# Patient Record
Sex: MALE | Race: WHITE | NOT HISPANIC OR LATINO | Employment: OTHER | URBAN - METROPOLITAN AREA
[De-identification: names, ages, dates, MRNs, and addresses within clinical notes are randomized per-mention and may not be internally consistent; named-entity substitution may affect disease eponyms.]

---

## 2018-05-27 ENCOUNTER — HOSPITAL ENCOUNTER (EMERGENCY)
Facility: HOSPITAL | Age: 23
Discharge: HOME | End: 2018-05-27
Attending: FAMILY MEDICINE
Payer: COMMERCIAL

## 2018-05-27 VITALS
WEIGHT: 170 LBS | TEMPERATURE: 98 F | HEIGHT: 67 IN | RESPIRATION RATE: 20 BRPM | BODY MASS INDEX: 26.68 KG/M2 | DIASTOLIC BLOOD PRESSURE: 62 MMHG | HEART RATE: 73 BPM | OXYGEN SATURATION: 100 % | SYSTOLIC BLOOD PRESSURE: 111 MMHG

## 2018-05-27 DIAGNOSIS — R60.9 SWELLING: ICD-10-CM

## 2018-05-27 DIAGNOSIS — R52 PAIN: ICD-10-CM

## 2018-05-27 PROCEDURE — 73610 X-RAY EXAM OF ANKLE: CPT | Mod: 26,RT,, | Performed by: RADIOLOGY

## 2018-05-27 PROCEDURE — 73610 X-RAY EXAM OF ANKLE: CPT | Mod: TC,FY,RT

## 2018-05-27 PROCEDURE — 99283 EMERGENCY DEPT VISIT LOW MDM: CPT | Mod: 25

## 2018-05-28 NOTE — ED PROVIDER NOTES
Encounter Date: 5/27/2018       History     Chief Complaint   Patient presents with    Ankle Pain     right ankle injury, rolled it jogging     22-year-old male, presents complaining of pain to his right ankle , he states he turned his ankle yesterday while jogging he denies knee pain or hip pain pain is dull nonradiating throbbing and constant          Review of patient's allergies indicates:  No Known Allergies  Past Medical History:   Diagnosis Date    ADHD     Asthma      Past Surgical History:   Procedure Laterality Date    HERNIA REPAIR       History reviewed. No pertinent family history.  Social History   Substance Use Topics    Smoking status: Current Some Day Smoker    Smokeless tobacco: Not on file      Comment: marijuana    Alcohol use No     Review of Systems   Constitutional: Negative for fever.   HENT: Negative for sore throat.    Respiratory: Negative for shortness of breath.    Cardiovascular: Negative for chest pain.   Gastrointestinal: Negative for nausea.   Genitourinary: Negative for dysuria.   Musculoskeletal: Positive for arthralgias, gait problem and joint swelling. Negative for back pain and myalgias.   Skin: Negative for rash.   Neurological: Negative for weakness.   Hematological: Does not bruise/bleed easily.       Physical Exam     Initial Vitals [05/27/18 2105]   BP Pulse Resp Temp SpO2   111/62 73 20 98.3 °F (36.8 °C) 100 %      MAP       78.33         Physical Exam    Nursing note and vitals reviewed.  Constitutional: He appears well-developed and well-nourished. He is not diaphoretic. No distress.   HENT:   Head: Normocephalic and atraumatic.   Right Ear: External ear normal.   Left Ear: External ear normal.   Nose: Nose normal.   Mouth/Throat: Oropharynx is clear and moist. No oropharyngeal exudate.   Eyes: EOM are normal.   Neck: Normal range of motion. Neck supple. No tracheal deviation present.   Cardiovascular: Normal rate and regular rhythm.   No murmur  heard.  Pulmonary/Chest: Breath sounds normal. No stridor. No respiratory distress. He has no rales.   Abdominal: Soft. He exhibits no distension and no mass. There is no tenderness. There is no rebound.   Musculoskeletal: Normal range of motion. He exhibits edema and tenderness.   Right ankle with tenderness to the lateral aspect positive swelling neurovascular is intact distally   Lymphadenopathy:     He has no cervical adenopathy.   Neurological: He is alert and oriented to person, place, and time. He has normal strength.   Skin: Skin is warm and dry. Capillary refill takes less than 2 seconds. No pallor.   Psychiatric: He has a normal mood and affect.         ED Course   Procedures  Labs Reviewed - No data to display       X-Rays:   Independently Interpreted Readings:   Other Readings:  Negative for fracture or dislocation                         Clinical Impression:   Diagnoses of Swelling and Pain were pertinent to this visit.                           Siddharth Keene MD  05/28/18 0142       Siddharth Keene MD  05/28/18 0143

## 2019-02-17 ENCOUNTER — HOSPITAL ENCOUNTER (EMERGENCY)
Facility: HOSPITAL | Age: 24
Discharge: HOME | End: 2019-02-17
Payer: COMMERCIAL

## 2019-02-17 VITALS
SYSTOLIC BLOOD PRESSURE: 108 MMHG | BODY MASS INDEX: 25.74 KG/M2 | TEMPERATURE: 99 F | WEIGHT: 164 LBS | HEART RATE: 81 BPM | OXYGEN SATURATION: 100 % | RESPIRATION RATE: 20 BRPM | DIASTOLIC BLOOD PRESSURE: 75 MMHG | HEIGHT: 67 IN

## 2019-02-17 DIAGNOSIS — R51.9 NONINTRACTABLE HEADACHE, UNSPECIFIED CHRONICITY PATTERN, UNSPECIFIED HEADACHE TYPE: Primary | ICD-10-CM

## 2019-02-17 LAB
INFLUENZA A, MOLECULAR: NEGATIVE
INFLUENZA B, MOLECULAR: NEGATIVE
SPECIMEN SOURCE: NORMAL

## 2019-02-17 PROCEDURE — 63600175 PHARM REV CODE 636 W HCPCS: Performed by: NURSE PRACTITIONER

## 2019-02-17 PROCEDURE — 99284 EMERGENCY DEPT VISIT MOD MDM: CPT

## 2019-02-17 PROCEDURE — 87502 INFLUENZA DNA AMP PROBE: CPT

## 2019-02-17 PROCEDURE — 96372 THER/PROPH/DIAG INJ SC/IM: CPT

## 2019-02-17 RX ORDER — KETOROLAC TROMETHAMINE 30 MG/ML
30 INJECTION, SOLUTION INTRAMUSCULAR; INTRAVENOUS
Status: COMPLETED | OUTPATIENT
Start: 2019-02-17 | End: 2019-02-17

## 2019-02-17 RX ORDER — PROMETHAZINE HYDROCHLORIDE 25 MG/ML
25 INJECTION, SOLUTION INTRAMUSCULAR; INTRAVENOUS
Status: COMPLETED | OUTPATIENT
Start: 2019-02-17 | End: 2019-02-17

## 2019-02-17 RX ORDER — DIPHENHYDRAMINE HYDROCHLORIDE 50 MG/ML
25 INJECTION INTRAMUSCULAR; INTRAVENOUS
Status: COMPLETED | OUTPATIENT
Start: 2019-02-17 | End: 2019-02-17

## 2019-02-17 RX ORDER — ONDANSETRON 4 MG/1
4 TABLET, FILM COATED ORAL EVERY 8 HOURS PRN
Qty: 10 TABLET | Refills: 0 | Status: SHIPPED | OUTPATIENT
Start: 2019-02-17

## 2019-02-17 RX ADMIN — KETOROLAC TROMETHAMINE 30 MG: 30 INJECTION, SOLUTION INTRAMUSCULAR at 12:02

## 2019-02-17 RX ADMIN — PROMETHAZINE HYDROCHLORIDE 25 MG: 25 INJECTION INTRAMUSCULAR; INTRAVENOUS at 12:02

## 2019-02-17 RX ADMIN — DIPHENHYDRAMINE HYDROCHLORIDE 25 MG: 50 INJECTION, SOLUTION INTRAMUSCULAR; INTRAVENOUS at 12:02

## 2019-02-17 NOTE — ED NOTES
Pt to discharge with steady gait  Breathing even and unlabored NAD Verbalized understanding of discharge instructions.Escorted patient to the discharge window.

## 2019-02-17 NOTE — ED NOTES
Patient complain of migraine type headache with photophobia nausea . New onset 2 days ago  Not able to make it go away with Tylenol or motrin.

## 2019-02-18 ENCOUNTER — NURSE TRIAGE (OUTPATIENT)
Dept: ADMINISTRATIVE | Facility: CLINIC | Age: 24
End: 2019-02-18

## 2019-02-18 NOTE — TELEPHONE ENCOUNTER
Reason for Disposition   Severe headache, states 'worst headache' of life    Protocols used: [facility] HEADACHE-A-[facility]    Pt states he was seen per ED yesterday for migraine. Pt states he tested negative for the flu. Pt states he received injections for HA without relief. Pt c/o HA since Thursday with fever noted on Friday. Pt states highest temp was 103. Pt rates pain 8/10. Pt denies history of migraines. Pt advised per protocol to ED and pt verbalizes understanding.

## 2019-02-19 NOTE — ED PROVIDER NOTES
Encounter Date: 2/17/2019       History     Chief Complaint   Patient presents with    Headache    Nausea    Vomiting     Claudette Dominguez is a 23 y.o male with sign PMHx ADHD and asthma. He presents to ED for headache, nausea, vomiting and fever since yesterday  No abdominal pain.  No sore throat, cough or congestion  No neurological deficits noted  No relief with Tylenol or Motrin  No visual changes      The history is provided by the patient.   Headache    This is a new problem. The current episode started yesterday. The problem occurs constantly. The problem has been unchanged. The pain is located in the bilateral region. The pain does not radiate. The pain quality is not similar to prior headaches. The quality of the pain is described as aching, throbbing and pounding. The pain is at a severity of 8/10. Associated symptoms include a fever, nausea, phonophobia and vomiting. Pertinent negatives include no abdominal pain, back pain, coughing, dizziness, ear pain, eye pain, eye redness, eye watering, facial sweating, loss of balance, neck pain, numbness, scalp tenderness, seizures, sinus pressure, sore throat, swollen glands, tingling, tinnitus, visual change, weakness or weight loss. The symptoms are aggravated by bright light. He has tried acetaminophen and NSAIDs for the symptoms. The treatment provided no relief.     Review of patient's allergies indicates:  No Known Allergies  Past Medical History:   Diagnosis Date    ADHD     Asthma      Past Surgical History:   Procedure Laterality Date    HERNIA REPAIR       History reviewed. No pertinent family history.  Social History     Tobacco Use    Smoking status: Current Some Day Smoker    Tobacco comment: marijuana   Substance Use Topics    Alcohol use: No    Drug use: No     Comment: history     Review of Systems   Constitutional: Positive for fever. Negative for weight loss.   HENT: Negative for congestion, ear pain, sinus pressure, sore throat and  tinnitus.    Eyes: Negative for pain and redness.   Respiratory: Negative for cough and shortness of breath.    Cardiovascular: Negative for chest pain.   Gastrointestinal: Positive for diarrhea, nausea and vomiting. Negative for abdominal distention and abdominal pain.   Genitourinary: Negative for dysuria.   Musculoskeletal: Negative for back pain, neck pain and neck stiffness.   Skin: Negative for rash.   Neurological: Positive for headaches. Negative for dizziness, tingling, tremors, seizures, syncope, facial asymmetry, speech difficulty, weakness, light-headedness, numbness and loss of balance.   Hematological: Does not bruise/bleed easily.   Psychiatric/Behavioral: Negative for agitation, confusion and decreased concentration.   All other systems reviewed and are negative.      Physical Exam     Initial Vitals [02/17/19 1058]   BP Pulse Resp Temp SpO2   108/75 81 20 98.8 °F (37.1 °C) 100 %      MAP       --         Physical Exam    Nursing note and vitals reviewed.  Constitutional: He appears well-developed and well-nourished.   HENT:   Head: Normocephalic.   Eyes: Pupils are equal, round, and reactive to light.   Neck: Normal range of motion.   Cardiovascular: Normal rate and regular rhythm.   Pulmonary/Chest: Breath sounds normal.   Neurological: He is alert and oriented to person, place, and time. He has normal strength. He displays a negative Romberg sign. GCS eye subscore is 4. GCS verbal subscore is 5. GCS motor subscore is 6.   Skin: Skin is warm and dry.   Psychiatric: He has a normal mood and affect. His behavior is normal. Judgment and thought content normal.         ED Course   Procedures  Labs Reviewed   INFLUENZA A & B BY MOLECULAR          Imaging Results    None          Medical Decision Making:   Initial Assessment:   Patient with headache, nausea, vomiting and fever since yesterday  No abdominal pain.  No sore throat, cough or congestion  No neurological deficits noted  No relief with Tylenol  or Motrin  No visual changes    Differential Diagnosis:   Headache or migraine  Sinusitis  Meningitis  Influenza    ED Management:  Meds admin     Flu test negative    Discussed physical exam findings with patient  No acute emergent medical condition identified at this time to warrant further testing/diagnostics.  Plan to discharge patient home with instructions per AVS.  Follow-up with PCP in 2-5 days or return to ED if symptoms worsen.  Patient verbalized agreement to discharge treatment plan.                      Clinical Impression:   The encounter diagnosis was Nonintractable headache, unspecified chronicity pattern, unspecified headache type.                             Mary Gilmore NP  02/19/19 4183

## 2023-03-06 ENCOUNTER — HOSPITAL ENCOUNTER (EMERGENCY)
Facility: HOSPITAL | Age: 28
Discharge: HOME | End: 2023-03-06
Attending: EMERGENCY MEDICINE

## 2023-03-06 VITALS
DIASTOLIC BLOOD PRESSURE: 64 MMHG | BODY MASS INDEX: 32.14 KG/M2 | RESPIRATION RATE: 16 BRPM | HEART RATE: 90 BPM | HEIGHT: 66 IN | SYSTOLIC BLOOD PRESSURE: 129 MMHG | WEIGHT: 200 LBS | OXYGEN SATURATION: 98 % | TEMPERATURE: 97 F

## 2023-03-06 DIAGNOSIS — R30.0 DYSURIA: Primary | ICD-10-CM

## 2023-03-06 LAB
BILIRUB UR QL STRIP: NEGATIVE
CLARITY UR: CLEAR
COLOR UR: YELLOW
GLUCOSE UR QL STRIP: NEGATIVE
HGB UR QL STRIP: NEGATIVE
KETONES UR QL STRIP: NEGATIVE
LEUKOCYTE ESTERASE UR QL STRIP: NEGATIVE
NITRITE UR QL STRIP: NEGATIVE
PH UR STRIP: 6 [PH] (ref 5–8)
PROT UR QL STRIP: NEGATIVE
SP GR UR STRIP: 1.02 (ref 1–1.03)
URN SPEC COLLECT METH UR: NORMAL
UROBILINOGEN UR STRIP-ACNC: NEGATIVE EU/DL

## 2023-03-06 PROCEDURE — 81003 URINALYSIS AUTO W/O SCOPE: CPT | Performed by: EMERGENCY MEDICINE

## 2023-03-06 PROCEDURE — 63600175 PHARM REV CODE 636 W HCPCS: Performed by: EMERGENCY MEDICINE

## 2023-03-06 PROCEDURE — 87591 N.GONORRHOEAE DNA AMP PROB: CPT | Performed by: EMERGENCY MEDICINE

## 2023-03-06 PROCEDURE — 99284 EMERGENCY DEPT VISIT MOD MDM: CPT

## 2023-03-06 PROCEDURE — 96372 THER/PROPH/DIAG INJ SC/IM: CPT | Performed by: EMERGENCY MEDICINE

## 2023-03-06 PROCEDURE — 63700000 PHARM REV CODE 250 ALT 637 W/O HCPCS: Performed by: EMERGENCY MEDICINE

## 2023-03-06 RX ORDER — CEFTRIAXONE 1 G/1
0.5 INJECTION, POWDER, FOR SOLUTION INTRAMUSCULAR; INTRAVENOUS
Status: COMPLETED | OUTPATIENT
Start: 2023-03-06 | End: 2023-03-06

## 2023-03-06 RX ORDER — AZITHROMYCIN 250 MG/1
1000 TABLET, FILM COATED ORAL
Status: COMPLETED | OUTPATIENT
Start: 2023-03-06 | End: 2023-03-06

## 2023-03-06 RX ADMIN — CEFTRIAXONE 0.5 G: 1 INJECTION, POWDER, FOR SOLUTION INTRAMUSCULAR; INTRAVENOUS at 08:03

## 2023-03-06 RX ADMIN — AZITHROMYCIN MONOHYDRATE 1000 MG: 250 TABLET ORAL at 08:03

## 2023-03-07 NOTE — ED PROVIDER NOTES
Encounter Date: 3/6/2023       History     Chief Complaint   Patient presents with    Dysuria     Pt reports having burning with urination started yesterday.      This patient is a 27-year-old male presenting to the emergency department complaints of dysuria, predominantly towards the end of urinating for 1 day.  He reports a recent sexual encounter and is unsure if he may have contracted a sexually transmitted infection.  He denies associated nausea, vomiting, fever, testicular pain or scrotal erythema.  He denies discharge.  He denies previous history of STIs.  He does report a previous history of urinary tract infections and is circumcised.    Review of patient's allergies indicates:  No Known Allergies  Past Medical History:   Diagnosis Date    ADHD     Asthma      Past Surgical History:   Procedure Laterality Date    HERNIA REPAIR       No family history on file.  Social History     Tobacco Use    Smoking status: Some Days    Tobacco comments:     marijuana   Substance Use Topics    Alcohol use: No    Drug use: No     Types: Marijuana     Comment: history     Review of Systems   Constitutional:  Negative for fever.   Gastrointestinal:  Negative for vomiting.   Genitourinary:  Positive for dysuria.   Musculoskeletal:  Negative for back pain.   Skin:  Negative for rash.   Allergic/Immunologic: Negative for immunocompromised state.     Physical Exam     Initial Vitals [03/06/23 1958]   BP Pulse Resp Temp SpO2   129/64 90 16 97.3 °F (36.3 °C) 98 %      MAP       --         Physical Exam    Nursing note and vitals reviewed.  Constitutional: He appears well-nourished.   Eyes: Conjunctivae and EOM are normal.   Neck: Neck supple.   Normal range of motion.  Pulmonary/Chest: No respiratory distress.   Musculoskeletal:         General: Normal range of motion.      Cervical back: Normal range of motion and neck supple.     Neurological: He is alert and oriented to person, place, and time. GCS score is 15. GCS eye subscore  is 4. GCS verbal subscore is 5. GCS motor subscore is 6.   Skin: Skin is warm and dry.   Psychiatric: He has a normal mood and affect. Thought content normal.       ED Course   Procedures  Labs Reviewed   C. TRACHOMATIS/N. GONORRHOEAE BY AMP DNA   URINALYSIS, REFLEX TO URINE CULTURE    Narrative:     Specimen Source->Urine          Imaging Results    None          Medications   cefTRIAXone injection 0.5 g (0.5 g Intramuscular Given 3/6/23 2037)   azithromycin tablet 1,000 mg (1,000 mg Oral Given 3/6/23 2037)     Medical Decision Making:   ED Management:  Patient rapidly assessed.  Vital signs are stable.  He is requesting empiric treatment for gonorrhea and chlamydia and received this without complication.  Urinalysis is within normal limits.  Gonorrhea and chlamydia testing are pending.  He is urged to follow up these results and to abstain from all sexual contact until all of his partners have been notified and fully treated.  He is [facility] also [facility] STI testing including hepatitis and HIV.  Encouraged to always wear barrier contraception.  He is asked to return to the emergency department immediately for any new, concerning, or worsening symptoms or follow up with a primary care doctor.  Patient agreeable and was discharged in stable condition.                        Clinical Impression:   Final diagnoses:  [R30.0] Dysuria (Primary)        ED Disposition Condition    Discharge Stable          ED Prescriptions    None       Follow-up Information       Follow up With Specialties Details Why Contact Info    [facility] Schedule an appointment as soon as possible for a visit   [address] [place] [id number] [phone]    [facility]  Schedule an appointment as soon as possible for a visit   [address] [place] [address]  [phone]               Rm Davis MD  03/07/23  [id number]

## 2023-03-07 NOTE — ED NOTES
Patient identifiers for Claudette Dominguez checked and correct.  LOC:  Claudette Dominguez is awake, alert, and aware of environment with an appropriate affect. He is oriented x 3 and speaking appropriately.  APPEARANCE:  He is resting comfortably and in no acute distress. He is clean and well groomed, patient's clothing is properly fastened.  SKIN:  The skin is warm and dry. He has normal skin turgor and moist mucus membranes. Skin is intact; no bruising or breakdown noted.  MUSCULOSKELETAL:  He is moving all extremities well, no obvious deformities noted. Pulses intact.   RESPIRATORY:  Airway is open and patent. Respirations are spontaneous and non-labored with normal effort and rate.  CARDIAC:  He has a normal rate and rhythm. No peripheral edema noted. Capillary refill < 3 seconds.  ABDOMEN:  No distention noted.  Soft and non-tender upon palpation.  dysuria  NEUROLOGICAL:  PERRL. Facial expression is symmetrical. Hand grasps are equal bilaterally. Normal sensation in all extremities when touched with finger.  Allergies reported:  Review of patient's allergies indicates:  No Known Allergies

## 2023-03-07 NOTE — ED NOTES
Pt AAOx4, Abc's intact. NADN. No adverse reaction to medication given. Pt walked to Registration for Check-Out. D/C instructions in pt possession along with belongings. No other needs at this time.

## 2023-03-08 LAB
C TRACH DNA SPEC QL NAA+PROBE: NOT DETECTED
N GONORRHOEA DNA SPEC QL NAA+PROBE: NOT DETECTED

## 2023-03-28 ENCOUNTER — OFFICE VISIT (OUTPATIENT)
Dept: FAMILY MEDICINE | Facility: CLINIC | Age: 28
End: 2023-03-28

## 2023-03-28 VITALS
DIASTOLIC BLOOD PRESSURE: 72 MMHG | OXYGEN SATURATION: 99 % | WEIGHT: 197.31 LBS | SYSTOLIC BLOOD PRESSURE: 108 MMHG | HEART RATE: 92 BPM | BODY MASS INDEX: 31.85 KG/M2 | TEMPERATURE: 98 F

## 2023-03-28 DIAGNOSIS — R30.0 DYSURIA: Primary | ICD-10-CM

## 2023-03-28 DIAGNOSIS — B37.41 CANDIDAL URETHRITIS: ICD-10-CM

## 2023-03-28 LAB
BILIRUBIN, UA POC OHS: NEGATIVE
BLOOD, UA POC OHS: NEGATIVE
CLARITY, UA POC OHS: CLEAR
COLOR, UA POC OHS: YELLOW
GLUCOSE, UA POC OHS: NEGATIVE
KETONES, UA POC OHS: NEGATIVE
LEUKOCYTES, UA POC OHS: NEGATIVE
NITRITE, UA POC OHS: NEGATIVE
PH, UA POC OHS: 7
PROTEIN, UA POC OHS: NEGATIVE
SPECIFIC GRAVITY, UA POC OHS: 1.02
UROBILINOGEN, UA POC OHS: 0.2

## 2023-03-28 PROCEDURE — 81003 POCT URINALYSIS(INSTRUMENT): ICD-10-PCS | Mod: QW,,, | Performed by: NURSE PRACTITIONER

## 2023-03-28 PROCEDURE — 81003 URINALYSIS AUTO W/O SCOPE: CPT | Mod: QW,,, | Performed by: NURSE PRACTITIONER

## 2023-03-28 PROCEDURE — 99203 PR OFFICE/OUTPT VISIT, NEW, LEVL III, 30-44 MIN: ICD-10-PCS | Mod: ,,, | Performed by: NURSE PRACTITIONER

## 2023-03-28 PROCEDURE — 99203 OFFICE O/P NEW LOW 30 MIN: CPT | Mod: ,,, | Performed by: NURSE PRACTITIONER

## 2023-03-28 RX ORDER — DOXYCYCLINE 100 MG/1
100 CAPSULE ORAL 2 TIMES DAILY
COMMUNITY
Start: 2023-03-11 | End: 2023-03-28 | Stop reason: ALTCHOICE

## 2023-03-28 RX ORDER — NYSTATIN AND TRIAMCINOLONE ACETONIDE 100000; 1 [USP'U]/G; MG/G
CREAM TOPICAL 2 TIMES DAILY
COMMUNITY
Start: 2023-03-20

## 2023-03-28 RX ORDER — FLUCONAZOLE 150 MG/1
TABLET ORAL
Qty: 2 TABLET | Refills: 0 | Status: SHIPPED | OUTPATIENT
Start: 2023-03-28

## 2023-03-28 NOTE — PROGRESS NOTES
"Subjective:       Patient ID: Claudette Dominguez is a 27 y.o. male.    Chief Complaint: Penis Problem  (Patient reports he has been having issues with redness, irritation, and burning on his penis. Patient reports this has been going on since approximately 3/6/23. Patient reports he has been seen through the ER for this and has also had multiple tests performed.)    Claudette Dominguez (Lance) presents to the clinic today for contiunue burning to penis  Has been seen multiple times in the ER for similar complaints.   HPI as follows   " Chief Complaint   Patient presents with   · Groin Swelling   States started on 3/6/23 with burning to penis. States that "the tip" is burning and has become red. Admits to having sex the same day that the pain started, but states that it started burning immediately after taking a shower. Only used a hypoallergenic soap at this time. Has been tested for STD's recently, but is concerned because he was not checked for herpes. States that he does not feel like it is STD.     27-year-old male patient presents to the emergency department with concerns for redness and irritation to the distal portion of his penis. He states that he may have caused some irritation with a new lubricant that utilized during sexual activity. He denies any fever or chills. He states that he has been tested for STDs and completed a course of doxycycline. He states that it improved with the doxycycline course. He denies any dysuria. He has had no pelvic pain. Said no fever or chills. "     Reports initially with the Doxycycline his symptoms improved, but did not resolve. He was provided a topical cream that he has only sparingly been using as he was unsure exactly where/how to place this.   Has nt had sexual intercourse since onset of s/sx. Has masturbated, but denies any issues with this.  Reports that pain is at ureteral opening, worse after voiding, now reports an odor, but denies any discharge. Also reports itching " to this groin- denies rash       Review of Systems    There is no problem list on file for this patient.      Objective:      Physical Exam  Exam conducted with a chaperone present.   Constitutional:       General: He is not in acute distress.     Appearance: Normal appearance.   Genitourinary:     Pubic Area: No rash.       Penis: Circumcised. Erythema and tenderness present. No discharge or lesions.       Testes: Normal.      Comments: Erythema/tenderness localized to urethral opening   Neurological:      Mental Status: He is alert.       No results found for: WBC, HGB, HCT, PLT, CHOL, TRIG, HDL, LDLDIRECT, ALT, AST, NA, K, CL, CREATININE, BUN, CO2, TSH, PSA, INR, GLUF, HGBA1C, MICROALBUR  The ASCVD Risk score (Moe DK, et al., 2019) failed to calculate for the following reasons:    The 2019 ASCVD risk score is only valid for ages 40 to 79  Visit Vitals  /72 (BP Location: Right arm, Patient Position: Sitting, BP Method: Large (Manual))   Pulse 92   Temp 98.4 °F (36.9 °C) (Oral)   Wt 89.5 kg (197 lb 5 oz)   SpO2 99%   BMI 31.85 kg/m²      Assessment:       1. Dysuria    2. Candidal urethritis          Plan:       1. Dysuria  -     POCT Urinalysis(Instrument)    2. Candidal urethritis  -     fluconazole (DIFLUCAN) 150 MG Tab; Take 1 tablet by mouth now, repeat dose again in 3 days  Dispense: 2 tablet; Refill: 0  Antibacterial soap, avoid hot water- luke warm, wet wipes (unscented) vs toilet paper, blot dry vs rubbing/air dry  Topical as instructed  Refrain from sex next 7-10 days  Take medication as prescribed   Notify office of unresolved s/sx      No follow-ups on file.

## 2023-04-03 ENCOUNTER — TELEPHONE (OUTPATIENT)
Dept: FAMILY MEDICINE | Facility: CLINIC | Age: 28
End: 2023-04-03

## 2023-04-03 ENCOUNTER — PATIENT MESSAGE (OUTPATIENT)
Dept: FAMILY MEDICINE | Facility: CLINIC | Age: 28
End: 2023-04-03

## 2023-04-03 DIAGNOSIS — B37.41 CANDIDAL URETHRITIS: ICD-10-CM

## 2023-04-03 DIAGNOSIS — R30.0 DYSURIA: Primary | ICD-10-CM

## 2023-04-03 NOTE — TELEPHONE ENCOUNTER
----- Message from Pau Jeronimo MA sent at 4/3/2023 12:48 PM CDT -----  Regarding: FW: advise  Contact: patient  Please advise( notes said to contact office if symptoms not resolved)      ----- Message -----  From: Kaye Stallworth  Sent: 4/3/2023  11:15 AM CDT  To: [facility] Staff  Subject: advise                                           Type: Needs Medical Advice  Who Called:  Patient  Symptoms (please be specific):  std burning  How long has patient had these symptoms:    Pharmacy name and phone #:    Best Call Back Number: [phone] (home)     Additional Information: Please call pt to advise. Thanks!

## 2023-04-04 ENCOUNTER — TELEPHONE (OUTPATIENT)
Dept: FAMILY MEDICINE | Facility: CLINIC | Age: 28
End: 2023-04-04

## 2023-04-04 NOTE — PROGRESS NOTES
"[facility] Note    PCP: Primary Doctor No    Chief Complaint: penile irritation     SUBJECTIVE:       History of Present Illness:  Claudette Dominguez is a 27 y.o. male who presents to clinic for penile irritation. He is New  to our clinic.     On 3/5 patient states that he went in for a massage and was also given a hand job at that time. He states that it was very aggressive and painful and following this he has had persistent pain and irritation at the head of his penis. A certain type of lube was used and following this he aggressively scrubbed himself in the shower.   He is also having lower abdominal pain.     Was given mycolog and doxycycline. Also given course of Diflucan.   STD tests negative.   UA 3/10/23: 2-5 RBCs, otherwise negative     UA today: negative for blood, leuks, nitrite     Last urine culture: no documented UTIs    Family  hx: no malignancy     Past medical, family, and social history reviewed as documented in chart with pertinent positive medical, family, and social history detailed in HPI.    Review of patient's allergies indicates:  No Known Allergies    Past Medical History:   Diagnosis Date    ADHD     Asthma      Past Surgical History:   Procedure Laterality Date    HERNIA REPAIR       No family history on file.  Social History     Tobacco Use    Smoking status: Some Days    Tobacco comments:     marijuana   Substance Use Topics    Alcohol use: No    Drug use: No     Types: Marijuana     Comment: history        Review of Systems    OBJECTIVE:     Anticoagulation:  no    Estimated body mass index is 32.28 kg/m² as calculated from the following:    Height as of this encounter: 5' 6" (1.676 m).    Weight as of this encounter: 90.7 kg (200 lb).    Vital Signs (Most Recent)       Physical Exam  Constitutional:       General: He is not in acute distress.     Appearance: Normal appearance. He is not ill-appearing.   HENT:      Head: Normocephalic and atraumatic.   Eyes:    "   General: No scleral icterus.  Cardiovascular:      Rate and Rhythm: Normal rate and regular rhythm.   Pulmonary:      Effort: Pulmonary effort is normal. No respiratory distress.   Abdominal:      General: There is no distension.   Genitourinary:     Penis: Normal and circumcised.       Testes:         Right: Mass, tenderness or swelling not present.         Left: Mass, tenderness or swelling not present.      Comments: There is some very minor irritation present on the glans and around the meatus. No blisters. Possible very small early warty changes around the meatus, but may just be from irritation.   Skin:     Coloration: Skin is not jaundiced.   Neurological:      Mental Status: He is alert and oriented to person, place, and time.   Psychiatric:         Mood and Affect: Mood normal.         Behavior: Behavior normal.         Thought Content: Thought content normal.       Imaging:  No pertinent recent imaging available.    ASSESSMENT     1. Dysuria    2. Candidal urethritis      PLAN:     - Betamethasone cream BID to glans x 14 days   - Trial of Flomax  - Ibuprofen 600 mg TID x 7 days  - STD testing is negative   - Urine today sent for repeat micro and culture     Sheila Galvan MD     Letter to Mary Cantu NP

## 2023-04-05 ENCOUNTER — OFFICE VISIT (OUTPATIENT)
Dept: UROLOGY | Facility: CLINIC | Age: 28
End: 2023-04-05

## 2023-04-05 VITALS — HEIGHT: 66 IN | BODY MASS INDEX: 32.14 KG/M2 | WEIGHT: 200 LBS

## 2023-04-05 DIAGNOSIS — R30.0 DYSURIA: Primary | ICD-10-CM

## 2023-04-05 DIAGNOSIS — B37.41 CANDIDAL URETHRITIS: ICD-10-CM

## 2023-04-05 LAB
BACTERIA #/AREA URNS HPF: NORMAL /HPF
BILIRUBIN, UA POC OHS: NEGATIVE
BLOOD, UA POC OHS: NEGATIVE
CLARITY, UA POC OHS: CLEAR
COLOR, UA POC OHS: YELLOW
GLUCOSE, UA POC OHS: NEGATIVE
KETONES, UA POC OHS: NEGATIVE
LEUKOCYTES, UA POC OHS: NEGATIVE
MICROSCOPIC COMMENT: NORMAL
NITRITE, UA POC OHS: NEGATIVE
PH, UA POC OHS: 6
PROTEIN, UA POC OHS: NEGATIVE
RBC #/AREA URNS HPF: 2 /HPF (ref 0–4)
SPECIFIC GRAVITY, UA POC OHS: 1.02
UROBILINOGEN, UA POC OHS: 0.2
WBC #/AREA URNS HPF: 1 /HPF (ref 0–5)

## 2023-04-05 PROCEDURE — 99204 OFFICE O/P NEW MOD 45 MIN: CPT | Mod: S$PBB,,, | Performed by: STUDENT IN AN ORGANIZED HEALTH CARE EDUCATION/TRAINING PROGRAM

## 2023-04-05 PROCEDURE — 87086 URINE CULTURE/COLONY COUNT: CPT | Performed by: STUDENT IN AN ORGANIZED HEALTH CARE EDUCATION/TRAINING PROGRAM

## 2023-04-05 PROCEDURE — 99999 PR PBB SHADOW E&M-EST. PATIENT-LVL III: ICD-10-PCS | Mod: PBBFAC,,, | Performed by: STUDENT IN AN ORGANIZED HEALTH CARE EDUCATION/TRAINING PROGRAM

## 2023-04-05 PROCEDURE — 99204 PR OFFICE/OUTPT VISIT, NEW, LEVL IV, 45-59 MIN: ICD-10-PCS | Mod: S$PBB,,, | Performed by: STUDENT IN AN ORGANIZED HEALTH CARE EDUCATION/TRAINING PROGRAM

## 2023-04-05 PROCEDURE — 99213 OFFICE O/P EST LOW 20 MIN: CPT | Mod: PBBFAC,PN | Performed by: STUDENT IN AN ORGANIZED HEALTH CARE EDUCATION/TRAINING PROGRAM

## 2023-04-05 PROCEDURE — 81000 URINALYSIS NONAUTO W/SCOPE: CPT | Performed by: STUDENT IN AN ORGANIZED HEALTH CARE EDUCATION/TRAINING PROGRAM

## 2023-04-05 PROCEDURE — 99999 PR PBB SHADOW E&M-EST. PATIENT-LVL III: CPT | Mod: PBBFAC,,, | Performed by: STUDENT IN AN ORGANIZED HEALTH CARE EDUCATION/TRAINING PROGRAM

## 2023-04-05 PROCEDURE — 81003 URINALYSIS AUTO W/O SCOPE: CPT | Mod: PBBFAC,PN | Performed by: STUDENT IN AN ORGANIZED HEALTH CARE EDUCATION/TRAINING PROGRAM

## 2023-04-05 RX ORDER — TAMSULOSIN HYDROCHLORIDE 0.4 MG/1
0.4 CAPSULE ORAL DAILY
Qty: 30 CAPSULE | Refills: 0 | Status: SHIPPED | OUTPATIENT
Start: 2023-04-05

## 2023-04-05 RX ORDER — IBUPROFEN 600 MG/1
600 TABLET ORAL 3 TIMES DAILY
Qty: 21 TABLET | Refills: 0 | Status: SHIPPED | OUTPATIENT
Start: 2023-04-05 | End: 2023-04-12

## 2023-04-05 RX ORDER — BETAMETHASONE DIPROPIONATE 0.5 MG/G
CREAM TOPICAL 2 TIMES DAILY
Qty: 45 G | Refills: 0 | Status: SHIPPED | OUTPATIENT
Start: 2023-04-05 | End: 2023-04-19

## 2023-04-07 LAB — BACTERIA UR CULT: NORMAL

## 2023-05-09 ENCOUNTER — TELEPHONE (OUTPATIENT)
Dept: UROLOGY | Facility: CLINIC | Age: 28
End: 2023-05-09

## 2023-05-09 NOTE — TELEPHONE ENCOUNTER
Spoke with patient, explained per department policy to seek another provider, it must be out of our practice as all of our providers are under the same umbrella. Patient stated that does not seem fair and can he speak to someone above me. Informed yes he can and message sent to supervisor to contact patient, patient verbally understood.

## 2023-05-24 ENCOUNTER — OFFICE VISIT (OUTPATIENT)
Dept: UROLOGY | Facility: CLINIC | Age: 28
End: 2023-05-24

## 2023-05-24 VITALS — WEIGHT: 200 LBS | HEIGHT: 66 IN | BODY MASS INDEX: 32.14 KG/M2

## 2023-05-24 DIAGNOSIS — R30.0 DYSURIA: Primary | ICD-10-CM

## 2023-05-24 DIAGNOSIS — N34.2 URETHRITIS: ICD-10-CM

## 2023-05-24 PROCEDURE — 99213 OFFICE O/P EST LOW 20 MIN: CPT | Mod: PBBFAC,PO | Performed by: NURSE PRACTITIONER

## 2023-05-24 PROCEDURE — 87591 N.GONORRHOEAE DNA AMP PROB: CPT | Performed by: NURSE PRACTITIONER

## 2023-05-24 PROCEDURE — 99999 PR PBB SHADOW E&M-EST. PATIENT-LVL III: ICD-10-PCS | Mod: PBBFAC,,, | Performed by: NURSE PRACTITIONER

## 2023-05-24 PROCEDURE — 87798 DETECT AGENT NOS DNA AMP: CPT | Performed by: NURSE PRACTITIONER

## 2023-05-24 PROCEDURE — 99999 PR PBB SHADOW E&M-EST. PATIENT-LVL III: CPT | Mod: PBBFAC,,, | Performed by: NURSE PRACTITIONER

## 2023-05-24 PROCEDURE — 99214 OFFICE O/P EST MOD 30 MIN: CPT | Mod: S$PBB,,, | Performed by: NURSE PRACTITIONER

## 2023-05-24 PROCEDURE — 99214 PR OFFICE/OUTPT VISIT, EST, LEVL IV, 30-39 MIN: ICD-10-PCS | Mod: S$PBB,,, | Performed by: NURSE PRACTITIONER

## 2023-05-24 NOTE — PATIENT INSTRUCTIONS
Avoid Bladder Irritants: Tea, coffee, caffeine, alcohol, artificial sweeteners, citrus, spicy foods, acidic foods,chocolate, tomato-based foods, smoking    Good water intake to maintain proper hydration. At least 2-3 L per day      Will call with urine results and treat based on results.    If symptoms persist, possible cystoscopy to further evaluate with Urologist    If skin rash, bumps persist, recommend f/u with dermatology

## 2023-05-24 NOTE — PROGRESS NOTES
CHIEF COMPLAINT:    Mr. Dominguez is a 27 y.o. male presenting for dysuria  PRESENTING ILLNESS:    Claudette Dominguez is a 27 y.o. male who presents for dysuria. Last clinic visit was 4/5/23 with Dr. Galvan.    5/24/23  Pt presents for continuing burning to urethra with and without voiding and post void dribbling. Occurs intermittently. No change since last visit. Denies gross hematuria, flank pain, fever, chills, nausea or vomiting. Denies scrotal swelling or testicle pain. Denies urinary frequency or urgency. Good urinary stream.  He reports no change in irritation near meatus, small bumps. No drainage. Betamethasone did not improve symptoms.    4/5/23 Micro UA RBC 2, WBC 1, rare bacteria; urine culture no growth    4/5/23  On 3/5 patient states that he went in for a massage and was also given a hand job at that time. He states that it was very aggressive and painful and following this he has had persistent pain and irritation at the head of his penis. A certain type of lube was used and following this he aggressively scrubbed himself in the shower.   He is also having lower abdominal pain.      Was given mycolog and doxycycline. Also given course of Diflucan.   STD tests negative.   UA 3/10/23: 2-5 RBCs, otherwise negative      UA today: negative for blood, leuks, nitrite      Last urine culture: no documented UTIs     Family  hx: no malignancy     REVIEW OF SYSTEMS:    Review of Systems    Constitutional: Negative for fever and chills.   HENT: Negative for hearing loss.   Eyes: Negative for visual disturbance.   Respiratory: Negative for shortness of breath.   Cardiovascular: Negative for chest pain.   Gastrointestinal: Negative for nausea, vomiting.   Genitourinary:  see above  Neurological: Negative for dizziness.   Hematological: Does not bruise/bleed easily.   Psychiatric/Behavioral: Negative for confusion.       PATIENT HISTORY:    Past Medical History:   Diagnosis Date    ADHD     Asthma        Past  Surgical History:   Procedure Laterality Date    HERNIA REPAIR         No family history on file.    Social History     Socioeconomic History    Marital status: Single   Tobacco Use    Smoking status: Some Days    Tobacco comments:     marijuana   Substance and Sexual Activity    Alcohol use: No    Drug use: No     Types: Marijuana     Comment: history    Sexual activity: Yes       Allergies:  Patient has no known allergies.    Medications:    Current Outpatient Medications:     fluconazole (DIFLUCAN) 150 MG Tab, Take 1 tablet by mouth now, repeat dose again in 3 days, Disp: 2 tablet, Rfl: 0    nystatin-triamcinolone (MYCOLOG II) cream, Apply topically 2 (two) times daily., Disp: , Rfl:     ondansetron (ZOFRAN) 4 MG tablet, Take 1 tablet (4 mg total) by mouth every 8 (eight) hours as needed for Nausea., Disp: 10 tablet, Rfl: 0    tamsulosin (FLOMAX) 0.4 mg Cap, Take 1 capsule (0.4 mg total) by mouth once daily., Disp: 30 capsule, Rfl: 0    betamethasone dipropionate 0.05 % cream, Apply topically 2 (two) times daily. Apply to tip of penis for 14 days, Disp: 45 g, Rfl: 0    PHYSICAL EXAMINATION:    Constitutional: He is oriented to person, place, and time. He appears well-developed and well-nourished.  He is in no apparent distress.    Neck: Normal ROM.     Cardiovascular: Normal rate.      Pulmonary/Chest: Effort normal. No respiratory distress.     Abdominal:  He exhibits no distension.  There is no CVA tenderness.     Neurological: He is alert and oriented to person, place, and time.     Skin: Skin is warm and dry.     Psych: Cooperative with normal affect.    Genitourinary: The penis is circumcised . The testes, epididymides, and cord structures are normal in size and contour bilaterally. The scrotum is normal in size and contour.  Mild erythema and irritation around meatus. No stricture noted.       Physical Exam      LABS:      No results found for: PSA, PSADIAG, PSATOTAL, PSAFREE, PSAFREEPCT  No results found  for: CREATININE      IMPRESSION:    Encounter Diagnoses   Name Primary?    Dysuria Yes    Urethritis          PLAN:  -Urine sent for G/C and ureaplasma, will call with results and treat based on results  -Avoid bladder irritants which can worsen burning. Good water intake to keep urine dilute  -If negative infection, recommend to follow up with urologist for possible cysto to look at urethra and prostate.   -F/u with dermatology regarding external skin irritation for possible biopsy/treatment since has failed treatment with mycolog and betamethasone cream.  -RTC based on results      I encouraged him or any of his family members to call or email me with questions and/or concerns.      30 minutes of total time spent on the encounter, which includes face to face time and non-face to face time preparing to see the patient (eg, review of tests), Obtaining and/or reviewing separately obtained history, Documenting clinical information in the electronic or other health record, Independently interpreting results (not separately reported) and communicating results to the patient/family/caregiver, or Care coordination (not separately reported).

## 2023-05-25 LAB
C TRACH DNA SPEC QL NAA+PROBE: NOT DETECTED
N GONORRHOEA DNA SPEC QL NAA+PROBE: NOT DETECTED

## 2023-05-30 LAB
SPECIMEN SOURCE: NORMAL
U PARVUM DNA SPEC QL NAA+PROBE: NEGATIVE
U UREALYTICUM DNA SPEC QL NAA+PROBE: NEGATIVE